# Patient Record
Sex: FEMALE | URBAN - NONMETROPOLITAN AREA
[De-identification: names, ages, dates, MRNs, and addresses within clinical notes are randomized per-mention and may not be internally consistent; named-entity substitution may affect disease eponyms.]

---

## 2024-07-23 ENCOUNTER — LAB OUTSIDE AN ENCOUNTER (OUTPATIENT)
Dept: URBAN - NONMETROPOLITAN AREA CLINIC 4 | Facility: CLINIC | Age: 19
End: 2024-07-23

## 2024-07-23 ENCOUNTER — DASHBOARD ENCOUNTERS (OUTPATIENT)
Age: 19
End: 2024-07-23

## 2024-07-23 ENCOUNTER — OFFICE VISIT (OUTPATIENT)
Dept: URBAN - NONMETROPOLITAN AREA CLINIC 4 | Facility: CLINIC | Age: 19
End: 2024-07-23
Payer: COMMERCIAL

## 2024-07-23 VITALS
TEMPERATURE: 98.1 F | DIASTOLIC BLOOD PRESSURE: 76 MMHG | SYSTOLIC BLOOD PRESSURE: 123 MMHG | BODY MASS INDEX: 33.3 KG/M2 | HEIGHT: 66 IN | WEIGHT: 207.2 LBS | HEART RATE: 90 BPM

## 2024-07-23 DIAGNOSIS — K52.89 (LYMPHOCYTIC) MICROSCOPIC COLITIS: ICD-10-CM

## 2024-07-23 DIAGNOSIS — K76.0 FATTY LIVER: ICD-10-CM

## 2024-07-23 DIAGNOSIS — K21.9 CHRONIC GERD: ICD-10-CM

## 2024-07-23 DIAGNOSIS — K58.0 IRRITABLE BOWEL SYNDROME WITH DIARRHEA: ICD-10-CM

## 2024-07-23 PROBLEM — 197321007: Status: ACTIVE | Noted: 2024-07-23

## 2024-07-23 PROBLEM — 197125005: Status: ACTIVE | Noted: 2024-07-23

## 2024-07-23 PROBLEM — 235595009: Status: ACTIVE | Noted: 2024-07-23

## 2024-07-23 PROCEDURE — 99204 OFFICE O/P NEW MOD 45 MIN: CPT | Performed by: REGISTERED NURSE

## 2024-07-23 RX ORDER — OMEPRAZOLE 40 MG/1
1 CAPSULE 30 MINUTES BEFORE MEAL CAPSULE, DELAYED RELEASE ORAL TWICE DAILY
Qty: 30 | Refills: 3 | OUTPATIENT
Start: 2024-07-23

## 2024-07-23 RX ORDER — NORGESTIMATE AND ETHINYL ESTRADIOL 7DAYSX3 LO
KIT ORAL
Qty: 84 TABLET | Status: ACTIVE | COMMUNITY

## 2024-07-23 NOTE — HPI-TODAY'S VISIT:
7/23/24: Pt is a 17 yo female with PMH of GERD, IBS, asthma who was referred by Dr. Edison Hernandez for evaluation of abdominal pain and diarrhea. A copy of this document will be sent to the referring physician.   Pt reports chronic postprandial abdominal pain with associated bloating and diarrhea for the past 4 years. States she will have up to 5-6 BMs daily. Denies nocturnal stools. Has neon green and orange stools. Denies hematochezia, but notices small amount of blood on toilet paper. Had cscope on 5/17/24 by Dr. Pelaez that was normal. Bioipsies reportedly normal. She was prescribed Bentyl, but she just got the medication today. RUQ US on 6/5/24 unremarkable except for mild diffuse fatty liver. Seen by ENT 3 yrs ago and had nasolaryngoscopy and was told she had reflux. She was prescribed omeprazole, which she took for 3-4 months which helped, but not currently taking. She reports nausea on an empty stomach, but denies vomiting. She has never had EGD. Maternal grandmother had GB removed and had colon cancer.

## 2024-07-24 LAB
ALBUMIN/GLOBULIN RATIO: 1.9
ALBUMIN: 4.5
ALKALINE PHOSPHATASE: 74
ALT: 12
AST: 12
BILIRUBIN, DIRECT: 0.1
BILIRUBIN, INDIRECT: 0.4
BILIRUBIN, TOTAL: 0.5
FIB 4 INDEX: 0.19
FIB 4 INTERPRETATION: (no result)
GLOBULIN: 2.4
PLATELET COUNT: 356
PROTEIN, TOTAL: 6.9

## 2024-07-26 ENCOUNTER — TELEPHONE ENCOUNTER (OUTPATIENT)
Dept: URBAN - NONMETROPOLITAN AREA CLINIC 4 | Facility: CLINIC | Age: 19
End: 2024-07-26

## 2024-08-20 ENCOUNTER — OFFICE VISIT (OUTPATIENT)
Dept: URBAN - NONMETROPOLITAN AREA CLINIC 4 | Facility: CLINIC | Age: 19
End: 2024-08-20

## 2024-08-22 ENCOUNTER — LAB OUTSIDE AN ENCOUNTER (OUTPATIENT)
Dept: URBAN - NONMETROPOLITAN AREA CLINIC 4 | Facility: CLINIC | Age: 19
End: 2024-08-22

## 2024-08-22 ENCOUNTER — OFFICE VISIT (OUTPATIENT)
Dept: URBAN - NONMETROPOLITAN AREA CLINIC 4 | Facility: CLINIC | Age: 19
End: 2024-08-22
Payer: COMMERCIAL

## 2024-08-22 VITALS
HEIGHT: 66 IN | TEMPERATURE: 98.6 F | HEART RATE: 98 BPM | WEIGHT: 207 LBS | DIASTOLIC BLOOD PRESSURE: 119 MMHG | BODY MASS INDEX: 33.27 KG/M2 | SYSTOLIC BLOOD PRESSURE: 166 MMHG

## 2024-08-22 DIAGNOSIS — K21.9 CHRONIC GERD: ICD-10-CM

## 2024-08-22 DIAGNOSIS — K76.0 FATTY LIVER: ICD-10-CM

## 2024-08-22 DIAGNOSIS — R11.0 NAUSEA: ICD-10-CM

## 2024-08-22 DIAGNOSIS — R10.84 GENERALIZED POSTPRANDIAL ABDOMINAL PAIN: ICD-10-CM

## 2024-08-22 PROCEDURE — 99213 OFFICE O/P EST LOW 20 MIN: CPT | Performed by: REGISTERED NURSE

## 2024-08-22 RX ORDER — OMEPRAZOLE 40 MG/1
1 CAPSULE 30 MINUTES BEFORE MEAL CAPSULE, DELAYED RELEASE ORAL TWICE DAILY
Qty: 30 | Refills: 3

## 2024-08-22 RX ORDER — COLESTIPOL HYDROCHLORIDE 1 G/1
2 TABLETS TABLET, FILM COATED ORAL ONCE A DAY
Qty: 60 | OUTPATIENT
Start: 2024-08-22

## 2024-08-22 RX ORDER — NORGESTIMATE AND ETHINYL ESTRADIOL 7DAYSX3 LO
KIT ORAL
Qty: 84 TABLET | Status: ACTIVE | COMMUNITY

## 2024-08-22 RX ORDER — HYOSCYAMINE SULFATE 0.125 MG
1 TABLET ON THE TONGUE AND ALLOW TO DISSOLVE AS NEEDED TABLET,DISINTEGRATING ORAL
Qty: 180 | Refills: 1 | OUTPATIENT
Start: 2024-08-22 | End: 2024-10-21

## 2024-08-22 RX ORDER — ONDANSETRON 4 MG/1
1 TABLET ON THE TONGUE AND ALLOW TO DISSOLVE TABLET, ORALLY DISINTEGRATING ORAL
Qty: 60 | Refills: 0 | OUTPATIENT
Start: 2024-08-22

## 2024-08-22 RX ORDER — OMEPRAZOLE 40 MG/1
1 CAPSULE 30 MINUTES BEFORE MEAL CAPSULE, DELAYED RELEASE ORAL TWICE DAILY
Qty: 30 | Refills: 3 | Status: ACTIVE | COMMUNITY
Start: 2024-07-23

## 2024-08-22 NOTE — HPI-TODAY'S VISIT:
7/23/24: Pt is a 17 yo female with PMH of GERD, IBS, asthma who was referred by Dr. Edison Hernandez for evaluation of abdominal pain and diarrhea. A copy of this document will be sent to the referring physician.   Pt reports chronic postprandial abdominal pain with associated bloating and diarrhea for the past 4 years. States she will have up to 5-6 BMs daily. Denies nocturnal stools. Has neon green and orange stools. Denies hematochezia, but notices small amount of blood on toilet paper. Had cscope on 5/17/24 by Dr. Pelaez that was normal. Bioipsies reportedly normal. She was prescribed Bentyl, but she just got the medication today. RUQ US on 6/5/24 unremarkable except for mild diffuse fatty liver. Seen by ENT 3 yrs ago and had nasolaryngoscopy and was told she had reflux. She was prescribed omeprazole, which she took for 3-4 months which helped, but not currently taking. She reports nausea on an empty stomach, but denies vomiting. She has never had EGD. Maternal grandmother had GB removed and had colon cancer.  8/22/24: Pt RTC for f/u. Took Xifaxin for 9 days and did not noticed any improvement. Having approx 3 BMs daily. Reports minimal improvement in abdominal pain with Bentyl. Acid reflux improved on omerpazole. Still c/o RUQ pain and nausea. She is scheduled for HIDA scan in September.

## 2024-09-19 ENCOUNTER — OFFICE VISIT (OUTPATIENT)
Dept: URBAN - NONMETROPOLITAN AREA CLINIC 4 | Facility: CLINIC | Age: 19
End: 2024-09-19

## 2024-09-19 RX ORDER — COLESTIPOL HYDROCHLORIDE 1 G/1
2 TABLETS TABLET, FILM COATED ORAL ONCE A DAY
Qty: 60 | Status: ACTIVE | COMMUNITY
Start: 2024-08-22

## 2024-09-19 RX ORDER — NORGESTIMATE AND ETHINYL ESTRADIOL 7DAYSX3 LO
KIT ORAL
Qty: 84 TABLET | Status: ACTIVE | COMMUNITY

## 2024-09-19 RX ORDER — OMEPRAZOLE 40 MG/1
1 CAPSULE 30 MINUTES BEFORE MEAL CAPSULE, DELAYED RELEASE ORAL TWICE DAILY
Qty: 30 | Refills: 3 | Status: ACTIVE | COMMUNITY

## 2024-09-19 RX ORDER — ONDANSETRON 4 MG/1
1 TABLET ON THE TONGUE AND ALLOW TO DISSOLVE TABLET, ORALLY DISINTEGRATING ORAL
Qty: 60 | Refills: 0 | Status: ACTIVE | COMMUNITY
Start: 2024-08-22

## 2024-09-19 RX ORDER — HYOSCYAMINE SULFATE 0.125 MG
1 TABLET ON THE TONGUE AND ALLOW TO DISSOLVE AS NEEDED TABLET,DISINTEGRATING ORAL
Qty: 180 | Refills: 1 | Status: ACTIVE | COMMUNITY
Start: 2024-08-22 | End: 2024-10-21

## 2024-10-21 ENCOUNTER — TELEPHONE ENCOUNTER (OUTPATIENT)
Dept: URBAN - NONMETROPOLITAN AREA CLINIC 4 | Facility: CLINIC | Age: 19
End: 2024-10-21